# Patient Record
Sex: MALE | Race: WHITE | URBAN - METROPOLITAN AREA
[De-identification: names, ages, dates, MRNs, and addresses within clinical notes are randomized per-mention and may not be internally consistent; named-entity substitution may affect disease eponyms.]

---

## 2024-10-08 ENCOUNTER — APPOINTMENT (OUTPATIENT)
Dept: PHYSICAL MEDICINE AND REHAB | Facility: MEDICAL CENTER | Age: 59
End: 2024-10-08
Payer: COMMERCIAL

## 2024-10-08 VITALS
HEIGHT: 71 IN | HEART RATE: 55 BPM | DIASTOLIC BLOOD PRESSURE: 78 MMHG | TEMPERATURE: 98.3 F | SYSTOLIC BLOOD PRESSURE: 116 MMHG | WEIGHT: 148.4 LBS | BODY MASS INDEX: 20.77 KG/M2 | OXYGEN SATURATION: 96 %

## 2024-10-08 DIAGNOSIS — M25.511 CHRONIC RIGHT SHOULDER PAIN: ICD-10-CM

## 2024-10-08 DIAGNOSIS — G89.29 CHRONIC RIGHT SHOULDER PAIN: ICD-10-CM

## 2024-10-08 DIAGNOSIS — S43.431A SUPERIOR GLENOID LABRUM LESION OF RIGHT SHOULDER, INITIAL ENCOUNTER: ICD-10-CM

## 2024-10-08 PROCEDURE — 3078F DIAST BP <80 MM HG: CPT | Performed by: PHYSICAL MEDICINE & REHABILITATION

## 2024-10-08 PROCEDURE — G2211 COMPLEX E/M VISIT ADD ON: HCPCS | Performed by: PHYSICAL MEDICINE & REHABILITATION

## 2024-10-08 PROCEDURE — 3074F SYST BP LT 130 MM HG: CPT | Performed by: PHYSICAL MEDICINE & REHABILITATION

## 2024-10-08 PROCEDURE — 1125F AMNT PAIN NOTED PAIN PRSNT: CPT | Performed by: PHYSICAL MEDICINE & REHABILITATION

## 2024-10-08 PROCEDURE — 99204 OFFICE O/P NEW MOD 45 MIN: CPT | Performed by: PHYSICAL MEDICINE & REHABILITATION

## 2024-10-08 RX ORDER — IBUPROFEN 200 MG
200 TABLET ORAL EVERY 6 HOURS PRN
COMMUNITY

## 2024-10-08 ASSESSMENT — PATIENT HEALTH QUESTIONNAIRE - PHQ9: CLINICAL INTERPRETATION OF PHQ2 SCORE: 0

## 2024-10-08 ASSESSMENT — PAIN SCALES - GENERAL: PAINLEVEL: 6=MODERATE PAIN

## 2024-11-20 ENCOUNTER — PHYSICAL THERAPY (OUTPATIENT)
Dept: PHYSICAL THERAPY | Facility: REHABILITATION | Age: 59
End: 2024-11-20
Attending: PHYSICAL MEDICINE & REHABILITATION
Payer: COMMERCIAL

## 2024-11-20 DIAGNOSIS — M25.511 RIGHT SHOULDER PAIN, UNSPECIFIED CHRONICITY: ICD-10-CM

## 2024-11-20 PROCEDURE — 97161 PT EVAL LOW COMPLEX 20 MIN: CPT

## 2024-11-20 PROCEDURE — 97110 THERAPEUTIC EXERCISES: CPT

## 2024-11-20 ASSESSMENT — ENCOUNTER SYMPTOMS
PAIN SCALE AT HIGHEST: 8
PAIN SCALE: 0

## 2024-11-20 NOTE — OP THERAPY EVALUATION
Outpatient Physical Therapy  INITIAL EVALUATION    [facility] [place]  [address]  [place] [address]  Phone:  [phone]  Fax:  [phone]    Date of Evaluation: 2024    Patient: Bryce Espitia  YOB: 1965  MRN: [id number]     Referring Provider: Hao Hernandez M.D.  [address]  [address]  [place] [address]   Referring Diagnosis Chronic right shoulder pain [M25.511, G89.29];Superior glenoid labrum lesion of right shoulder, initial encounter [S43.431A]     Time Calculation  Start time: 1030  Stop time: 1130 Time Calculation (min): 60 minutes         Chief Complaint: R shoulder    Visit Diagnoses     ICD-10-CM   1. Right shoulder pain, unspecified chronicity  M25.511       Date of onset of impairment: No data found    Subjective:   History of Present Illness:     Mechanism of injury:  Bryce Espitia is a 59 y.o. male that presents to therapy with right shoulder pain. His symptoms came on with insidious onset and has been occurring for about a year or maybe longer. His pain is located along the font of his R shoulder without descending. Patient denies fevers/chills, numbness/weakness of upper extremities, dizziness, dysphagia, nausea, diplopia, ataxia, and dysarthria.    Aggravating factors: overhead pressing, lifting weights, laying on his back, waking up at night in pain.   Relieving factors:rest    ADL limitations: limited activity tolerance, limited sleep   Pain:     Current pain ratin (at rest)    At worst pain ratin      Past Medical History:   Diagnosis Date    Glaucoma      Past Surgical History:   Procedure Laterality Date    HERNIA REPAIR       Social History     Tobacco Use    Smoking status: Never    Smokeless tobacco: Never   Substance Use Topics    Alcohol use: Not on file     Family and Occupational History     Socioeconomic History    Marital status: Single     Spouse name: Not on file    Number of children: Not on file     Years of education: Not on file    Highest education level: Not on file   Occupational History    Not on file       Objective     Cervical Screen    Cervical range of motion within normal limits  Thoracic Screen    Thoracic range of motion within normal limits    Active Range of Motion   Left Shoulder   Normal active range of motion  Flexion: 165 degrees     Right Shoulder   Flexion: 160 degrees   Abduction: WFL  External rotation 90°: WFL  Internal rotation 90°: WFL    Passive Range of Motion   Left Shoulder   Normal passive range of motion    Right Shoulder   Normal passive range of motion    Strength:      Left Shoulder   Normal muscle strength    Right Shoulder   Normal muscle strength    Tests     Right Shoulder   Positive crank.   Negative empty can, full can, jerk, Neer's, painful arc and Speed's.         Therapeutic Exercises (CPT 44777):       Therapeutic Exercise Summary: HEP instruction/performance and development. Handout provided and exercises located below:  Access Code: [email or web address]  URL: [email or web address]  Date: 11/20/2024  Prepared by: Artemio Carvalho    Program Notes  pictures of machine, (pull down handle)    Exercises  - Standing Bilateral Low Shoulder Row with Anchored Resistance  - 1 x daily - 2-3 sets - 8-12 reps  - Standing Shoulder Flexion Stretch on Wall  - 1-2 x daily - 1-2 reps - 15-20 hold  - Shoulder External Rotation and Scapular Retraction with Resistance  - 2 x daily - 1-2 sets - 20 reps  - Seated Scapular Retraction  - 3-5 x daily - 10 reps    Patient Education  - Shoulder Instability (handout for shoulder labral information)      Time-based treatments/modalities:    Physical Therapy Timed Treatment Charges  Therapeutic exercise minutes (CPT 78200): 25 minutes      Assessment, Response and Plan:   Assessment details:  Bryce Espitia is a 59 y.o. male with signs and symptoms consistent with suspected minor R shoulder labral pathology. He requires skilled physical therapy  intervention to decrease pain, increase range of motion, increase functional mobility, improve ADL completion and establish a home exercise program.  Goals:   Short Term Goals:   1. Patient will be Independent with prescribed Home Exercise Program (HEP) and will be able to demonstrate exercises without cues for improved overall symptoms/activity tolerance.   2. Pt will improve R shoulder flexion AROM to 165 or greater for improved overhead reach during all overhead activities.  Short term goal time span:  2-4 weeks      Long Term Goals:    3. Pt will improve ability to perform some form of overhead lifting without reported increase in pain >2/10  4. Pt will have improved SPADI score of less than 10/130 indicative of improved function and reduced disability.  Long term goal time span:  4-6 weeks    Plan:   Planned therapy interventions:  Therapeutic Exercise (CPT 70745), Manual Therapy (CPT 15153), Neuromuscular Re-education (CPT 87072) and E Stim Unattended (CPT 56022)  Frequency:  1x week  Duration in weeks:  8  Discussed with:  Patient  Plan details:  Appts to likely taper in frequency with promotion towards independence.     Functional Assessment Used  PT Functional Assessment Tool Used: SPADI  PT Functional Assessment Score: 19/120     Referring provider co-signature:  I have reviewed this plan of care and my co-signature certifies the need for services.    Certification Period: 11/20/2024 to  01/15/25    Physician Signature: ________________________________ Date: ______________

## 2024-11-26 ENCOUNTER — APPOINTMENT (OUTPATIENT)
Dept: PHYSICAL THERAPY | Facility: REHABILITATION | Age: 59
End: 2024-11-26
Attending: PHYSICAL MEDICINE & REHABILITATION
Payer: COMMERCIAL

## 2024-12-11 ENCOUNTER — APPOINTMENT (OUTPATIENT)
Dept: PHYSICAL THERAPY | Facility: REHABILITATION | Age: 59
End: 2024-12-11
Attending: PHYSICAL MEDICINE & REHABILITATION
Payer: COMMERCIAL

## 2024-12-11 NOTE — OP THERAPY DAILY TREATMENT
"  Outpatient Physical Therapy  DAILY TREATMENT     [facility] [place]  [address], [address]  [facility] [address]  Phone:  [phone]  Fax:  [phone]    Date: 12/11/2024    Patient: Bryce Espitia  YOB: 1965  MRN: [id number]     Time Calculation                   Chief Complaint: No chief complaint on file.    Visit #: 2    SUBJECTIVE:  ***    OBJECTIVE:  Current objective measures: ***          Therapeutic Exercises (CPT 19341):       Therapeutic Exercise Summary: HEP instruction/performance and development. Handout provided and exercises located below:  Access Code: [email or web address]  URL: [email or web address]  Date: 11/20/2024  Prepared by: Artemio Carvalho    Program Notes  pictures of machine, (pull down handle)    Exercises  - Standing Bilateral Low Shoulder Row with Anchored Resistance  - 1 x daily - 2-3 sets - 8-12 reps  - Standing Shoulder Flexion Stretch on Wall  - 1-2 x daily - 1-2 reps - 15-20 hold  - Shoulder External Rotation and Scapular Retraction with Resistance  - 2 x daily - 1-2 sets - 20 reps  - Seated Scapular Retraction  - 3-5 x daily - 10 reps    Patient Education  - Shoulder Instability (handout for shoulder labral information)      Time-based treatments/modalities:           Pain rating (1-10) before treatment:  {PAIN NUMBERS_1-10:01121}  Pain rating (1-10) after treatment:  {PAIN NUMBERS_1-10:30047}    ASSESSMENT:   Response to treatment: ***    PLAN/RECOMMENDATIONS:   Plan for treatment: {AMB OP THERAPY - THERAPY PLAN:[id number]::\"therapy treatment to continue next visit\"}.  Planned interventions for next visit: {PT PLANNED THERAPY INTERVENTIONS:869139326::\"continue with current treatment\"}.         "

## 2024-12-12 ENCOUNTER — PHYSICAL THERAPY (OUTPATIENT)
Dept: PHYSICAL THERAPY | Facility: REHABILITATION | Age: 59
End: 2024-12-12
Attending: PHYSICAL MEDICINE & REHABILITATION
Payer: COMMERCIAL

## 2024-12-12 DIAGNOSIS — M25.511 RIGHT SHOULDER PAIN, UNSPECIFIED CHRONICITY: ICD-10-CM

## 2024-12-12 PROCEDURE — 97110 THERAPEUTIC EXERCISES: CPT

## 2024-12-12 NOTE — OP THERAPY DAILY TREATMENT
Outpatient Physical Therapy  DAILY TREATMENT     [facility] [place]  [address], [address]  [facility] [address]  Phone:  [phone]  Fax:  [phone]    Date: 12/12/2024    Patient: Bryce Espitia  YOB: 1965  MRN: [id number]     Time Calculation    Start time: 0730  Stop time: 0815 Time Calculation (min): 45 minutes         Chief Complaint: R shoulder   Visit #: 2    SUBJECTIVE:  Reports compliance of HEP. Notes no increase in pain and overall controlled symptoms. Has avoided overhead pressing movements.     OBJECTIVE:  Current objective measures: ROM WNL          Therapeutic Exercises (CPT 75984):     1. pulleys, 2min    2. Supine stick flexion, x 15, ER stick full motion    3. standing scaption, 2# 2x 15    4. banded ER, green x 25    5. banded low row, with scap retraction x 30 purple      Therapeutic Exercise Summary: HEP instruction/performance and development. Handout provided and exercises located below:  Access Code: [email or web address]  URL: [email or web address]  Date: 12/12/2024  Prepared by: Artemio Carvalho    Exercises  - Standing Shoulder Flexion Stretch on Wall  - 1-2 x daily - 15-20 hold  - Scaption with Dumbbells  - 2 x daily - 10 reps  - Shoulder External Rotation and Scapular Retraction with Resistance  - 2 x daily - 20 reps  - Standing Bilateral Low Shoulder Row with Anchored Resistance  - 3 x weekly - 2-3 sets - 8-12 reps  - Lat Pull Down  - 3 x weekly - 2 sets - 10-12 reps      Time-based treatments/modalities:    Physical Therapy Timed Treatment Charges  Therapeutic exercise minutes (CPT 80060): 45 minutes      Pain rating (1-10) before treatment:  0  Pain rating (1-10) after treatment:  0    ASSESSMENT:   Response to treatment: Symptoms controlled and consistent sub acromial pain. HEP progressed and tolerated well. F/u next week.     PLAN/RECOMMENDATIONS:   Plan for treatment: therapy treatment to continue next visit.  Planned interventions for next  visit: continue with current treatment.

## 2024-12-17 ENCOUNTER — APPOINTMENT (OUTPATIENT)
Dept: PHYSICAL THERAPY | Facility: REHABILITATION | Age: 59
End: 2024-12-17
Attending: PHYSICAL MEDICINE & REHABILITATION
Payer: COMMERCIAL

## 2024-12-19 ENCOUNTER — PHYSICAL THERAPY (OUTPATIENT)
Dept: PHYSICAL THERAPY | Facility: REHABILITATION | Age: 59
End: 2024-12-19
Attending: PHYSICAL MEDICINE & REHABILITATION
Payer: COMMERCIAL

## 2024-12-19 DIAGNOSIS — M25.511 RIGHT SHOULDER PAIN, UNSPECIFIED CHRONICITY: ICD-10-CM

## 2024-12-19 PROCEDURE — 97110 THERAPEUTIC EXERCISES: CPT

## 2024-12-19 NOTE — OP THERAPY DAILY TREATMENT
Outpatient Physical Therapy  DAILY TREATMENT     [facility] [place]  [address] [place] [address], [address]  [facility] [address]  Phone:  [phone]  Fax:  [phone]    Date: 12/19/2024    Patient: Bryce Espitia  YOB: 1965  MRN: [id number]     Time Calculation    Start time: 1540  Stop time: 1630 Time Calculation (min): 50 minutes       Chief Complaint: R shoulder   Visit #: 3    SUBJECTIVE:  Reports compliance of HEP. Notes no increase in pain and overall controlled symptoms. No night pain reported either.     OBJECTIVE:  Current objective measures: ROM WNL          Therapeutic Exercises (CPT 44911):     1. Supine foam roller series, blue band x 5min    3. discussion on gym exercises with reproduction of movement in gym and progression vs scheduling days off      Therapeutic Exercise Summary: HEP instruction/performance and development. Handout provided and exercises located below:  Access Code: [email or web address]  URL: [email or web address]  Date: 12/19/2024  Prepared by: Artemio Carvalho    Exercises  - Scaption with Dumbbells  - 2 x daily - 3 x weekly - 10-12 reps  - Shoulder External Rotation and Scapular Retraction with Resistance  - 3 x weekly - 1-2 sets - 20 reps  - Seated Row Cable Machine  - 3 x weekly - 2-3 sets - 8-12 reps  - Lat Pull Down  - 3 x weekly - 2 sets - 10-12 reps  - Supine Shoulder Horizontal Abduction with Resistance  - 3 x weekly - 2 sets - 15-20 reps  - Supine Chest Stretch on Foam Roll  - 3 x weekly      Time-based treatments/modalities:    Physical Therapy Timed Treatment Charges  Therapeutic exercise minutes (CPT 50718): 50 minutes      Pain rating (1-10) before treatment:  0  Pain rating (1-10) after treatment:  0    ASSESSMENT:   Response to treatment: Symptoms controlled and consistent sub acromial pain. HEP progressed/practiced and tolerated well. F/u next week.      PLAN/RECOMMENDATIONS:   Plan for treatment: therapy treatment to continue next  visit.  Planned interventions for next visit: continue with current treatment.

## 2025-01-08 ENCOUNTER — PHYSICAL THERAPY (OUTPATIENT)
Dept: PHYSICAL THERAPY | Facility: REHABILITATION | Age: 60
End: 2025-01-08
Attending: PHYSICAL MEDICINE & REHABILITATION
Payer: COMMERCIAL

## 2025-01-08 DIAGNOSIS — M25.511 RIGHT SHOULDER PAIN, UNSPECIFIED CHRONICITY: ICD-10-CM

## 2025-01-08 PROCEDURE — 97110 THERAPEUTIC EXERCISES: CPT

## 2025-01-08 NOTE — OP THERAPY DAILY TREATMENT
Outpatient Physical Therapy  DAILY TREATMENT     [facility] [place]  [address]  [facility] [address]  Phone:  [phone]  Fax:  [phone]    Date: 01/08/2025    Patient: Bryce Espitia  YOB: 1965  MRN: [id number]     Time Calculation    Start time: 1030  Stop time: 1115 Time Calculation (min): 45 minutes       Chief Complaint: R shoulder   Visit #: 4    SUBJECTIVE:   No reported issues with exercises or any functional activity. Reports compliance of HEP. Notes no increase in pain and overall controlled symptoms. Continued reports of no night pain.     OBJECTIVE:  Current objective measures: ROM WNL  PT Functional Assessment Tool Used: SPADI  PT Functional Assessment Score: 3/130       Therapeutic Exercises (CPT 62622):     1. discussion on gym exercises with reproduction of movement in gym and progression vs scheduling days off    2. long term expectations and progression discussed.      Therapeutic Exercise Summary: HEP instruction/performance and development. Handout provided and exercises located below:  Access Code: [email or web address]  URL: [email or web address]  Date: 12/19/2024  Prepared by: Artemio Carvalho    Exercises  - Scaption with Dumbbells  - 2 x daily - 3 x weekly - 10-12 reps  - Shoulder External Rotation and Scapular Retraction with Resistance  - 3 x weekly - 1-2 sets - 20 reps  - Seated Row Cable Machine  - 3 x weekly - 2-3 sets - 8-12 reps  - Lat Pull Down  - 3 x weekly - 2 sets - 10-12 reps  - Supine Shoulder Horizontal Abduction with Resistance  - 3 x weekly - 2 sets - 15-20 reps  - Supine Chest Stretch on Foam Roll  - 3 x weekly      Time-based treatments/modalities:    Physical Therapy Timed Treatment Charges  Therapeutic exercise minutes (CPT 31307): 38 minutes      Pain rating (1-10) before treatment:  0  Pain rating (1-10) after treatment:  0    ASSESSMENT:   Response to treatment: Symptoms controlled and consistent with recovering sub  acromial pain. Goals met. D/C from PT.      PLAN/RECOMMENDATIONS:   Plan for treatment: discharge patient due to accomplished goals.

## 2025-01-08 NOTE — OP THERAPY DISCHARGE SUMMARY
Outpatient Physical Therapy  DISCHARGE SUMMARY NOTE      [facility] [place]  [address]  [facility] [address]  Phone:  [phone]  Fax:  [phone]    Date of Visit: 01/08/2025    Patient: Bryce Espitia  YOB: 1965  MRN: [id number]     Referring Provider: Hao Hernandez M.D.  [address]  [address]  [place] [address]   Referring Diagnosis Other chronic pain [G89.29]         Functional Assessment Used  PT Functional Assessment Tool Used: SPADI  PT Functional Assessment Score: 3/130     Your patient is being discharged from Physical Therapy with the following comments:   Goals met    Comments:  Bryce Espitia has completed 4 physical therapy sessions on his current prescription. He has improved function, decreased pain, improved strength, consistent ROM,  and he continues to progress with his home exercise program. Recommend to discharge patient to full independent home exercise program at this time. Thank you for the opportunity to assist you and your patient.         Limitations Remaining:  none    Recommendations:  Continue HEP as discussed.     Artemio Carvalho, PT, DPT    Date: 1/8/2025

## 2025-02-12 ENCOUNTER — OFFICE VISIT (OUTPATIENT)
Dept: URGENT CARE | Facility: PHYSICIAN GROUP | Age: 60
End: 2025-02-12
Payer: COMMERCIAL

## 2025-02-12 VITALS
HEART RATE: 71 BPM | TEMPERATURE: 98 F | BODY MASS INDEX: 20.02 KG/M2 | OXYGEN SATURATION: 100 % | HEIGHT: 71 IN | WEIGHT: 143 LBS | DIASTOLIC BLOOD PRESSURE: 68 MMHG | RESPIRATION RATE: 20 BRPM | SYSTOLIC BLOOD PRESSURE: 128 MMHG

## 2025-02-12 DIAGNOSIS — J01.10 ACUTE FRONTAL SINUSITIS, RECURRENCE NOT SPECIFIED: ICD-10-CM

## 2025-02-12 PROCEDURE — 3078F DIAST BP <80 MM HG: CPT

## 2025-02-12 PROCEDURE — 3074F SYST BP LT 130 MM HG: CPT

## 2025-02-12 PROCEDURE — 99203 OFFICE O/P NEW LOW 30 MIN: CPT

## 2025-02-12 RX ORDER — PSEUDOEPHEDRINE HCL 30 MG/1
60 TABLET, FILM COATED ORAL EVERY 4 HOURS PRN
Qty: 120 TABLET | Refills: 6 | Status: SHIPPED | OUTPATIENT
Start: 2025-02-12

## 2025-02-12 RX ORDER — PREDNISONE 20 MG/1
40 TABLET ORAL DAILY
Qty: 8 TABLET | Refills: 0 | Status: SHIPPED | OUTPATIENT
Start: 2025-02-12 | End: 2025-02-16

## 2025-02-12 ASSESSMENT — ENCOUNTER SYMPTOMS
VOMITING: 0
SORE THROAT: 0
HEADACHES: 0
MYALGIAS: 0
FEVER: 0
COUGH: 1
PALPITATIONS: 0
TINGLING: 0
SHORTNESS OF BREATH: 0
BLURRED VISION: 0
SINUS PAIN: 0
SPUTUM PRODUCTION: 1
HEMOPTYSIS: 0
DIARRHEA: 0
DOUBLE VISION: 0
NAUSEA: 0
WHEEZING: 0
DIZZINESS: 0
WEAKNESS: 0
CHILLS: 0

## 2025-02-12 NOTE — PROGRESS NOTES
Subjective:   Bryce Espitia is a 59 y.o. male who presents for Nasal Congestion (On set 3 days)    Patient presents to the clinic for complaints of nasal congestion and cough x 3 days.  States that he has been feeling a bit run down over the last 10 days or so due to a lot of travelling, but started feeling sick 3 days ago.   Cough has been mild, intermittent, and occasionally productive w/ minimal sputum.  His nasal congestion is his symptom of highest concern with pressure being built up over the last few days.  Otherwise, he does not feel ill or sick.  Has taken OTC cough and cold medications without any improvement of his symptoms.  Patient denies fever, chills, body aches, sore throat, weakness, ear pain, N/V/D, or dizziness/lightheadedness.     HPI    Review of Systems   Constitutional:  Negative for chills, fever and malaise/fatigue.   HENT:  Positive for congestion. Negative for ear pain, sinus pain and sore throat.    Eyes:  Negative for blurred vision and double vision.   Respiratory:  Positive for cough and sputum production. Negative for hemoptysis, shortness of breath and wheezing.    Cardiovascular:  Negative for chest pain and palpitations.   Gastrointestinal:  Negative for diarrhea, nausea and vomiting.   Genitourinary:  Negative for dysuria.   Musculoskeletal:  Negative for myalgias.   Neurological:  Negative for dizziness, tingling, weakness and headaches.   All other systems reviewed and are negative.    Refer to HPI for additional details.    During this visit, appropriate PPE was worn, and hand hygiene was performed.    PMH:  has a past medical history of Glaucoma.    MEDS:   Current Outpatient Medications:     predniSONE (DELTASONE) 20 MG Tab, Take 2 Tablets by mouth every day for 4 days., Disp: 8 Tablet, Rfl: 0    pseudoephedrine (SUDAFED) 30 MG Tab, Take 2 Tablets by mouth every four hours as needed for Congestion., Disp: 120 Tablet, Rfl: 6    Multiple Vitamin (MULTIVITAMINS PO), Take  " by mouth., Disp: , Rfl:     ibuprofen (MOTRIN) 200 MG Tab, Take 200 mg by mouth every 6 hours as needed for Mild Pain., Disp: , Rfl:     diclofenac sodium (VOLTAREN) 1 % Gel, Apply 3 g topically 4 times a day as needed (pain)., Disp: 100 g, Rfl: 11    ALLERGIES: No Known Allergies  SURGHX:   Past Surgical History:   Procedure Laterality Date    HERNIA REPAIR       SOCHX:  reports that he has never smoked. He has never used smokeless tobacco.    FH: Per HPI as applicable/pertinent.    Medications, Allergies, and current problem list reviewed today in Epic.     Objective:     /68 (BP Location: Left arm, Patient Position: Sitting, BP Cuff Size: Adult long)   Pulse 71   Temp 36.7 °C (98 °F) (Temporal)   Resp 20   Ht 1.803 m (5' 11\")   Wt 64.9 kg (143 lb)   SpO2 100%     Physical Exam  Constitutional:       Appearance: Normal appearance. He is not ill-appearing or toxic-appearing.   HENT:      Head: Normocephalic.      Right Ear: Tympanic membrane, ear canal and external ear normal.      Left Ear: Tympanic membrane, ear canal and external ear normal.      Nose: Congestion present. No rhinorrhea.      Mouth/Throat:      Mouth: Mucous membranes are moist.      Pharynx: Oropharynx is clear. No oropharyngeal exudate or posterior oropharyngeal erythema.   Eyes:      General:         Right eye: No discharge.         Left eye: No discharge.      Extraocular Movements: Extraocular movements intact.      Conjunctiva/sclera: Conjunctivae normal.      Pupils: Pupils are equal, round, and reactive to light.   Cardiovascular:      Rate and Rhythm: Normal rate and regular rhythm.      Pulses: Normal pulses.      Heart sounds: Normal heart sounds. No murmur heard.  Pulmonary:      Effort: Pulmonary effort is normal. No respiratory distress.      Breath sounds: Normal breath sounds. No wheezing or rhonchi.   Abdominal:      General: Abdomen is flat.   Musculoskeletal:         General: No signs of injury. Normal range of " motion.      Cervical back: Normal range of motion. No rigidity.   Lymphadenopathy:      Cervical: No cervical adenopathy.   Skin:     General: Skin is warm and dry.   Neurological:      General: No focal deficit present.      Mental Status: He is alert and oriented to person, place, and time.      Motor: No weakness.         Assessment/Plan:     Diagnosis and associated orders:     1. Acute frontal sinusitis, recurrence not specified  - predniSONE (DELTASONE) 20 MG Tab; Take 2 Tablets by mouth every day for 4 days.  Dispense: 8 Tablet; Refill: 0  - pseudoephedrine (SUDAFED) 30 MG Tab; Take 2 Tablets by mouth every four hours as needed for Congestion.  Dispense: 120 Tablet; Refill: 6     Comments/MDM:     Discussed that likely etiology of the patient's symptoms is acute frontal sinusitis, likely of viral etiology.  Prednisone 4-day course as well as Sudafed as needed prescribed to the patient. Discussed proper administration and dosing as well as associated adverse/side effects of prescribed medications.  Advised patient to continue OTC pharmacologic and nonpharmacologic treatment of her symptoms, including but not limited to Tylenol, Motrin, OTC cough and cold medication, decongestants, and plenty of rest and fluids.  Patient agreeable to this plan of care.  All questions answered.  Return to clinic if symptoms persist or worsen.  Red flag symptoms warranting emergency medical services discussed, including but not limited to chest pain, SOB, wheezing, difficulty breathing or swallowing, sensation of throat closing or mass in the throat, severe intractable headache, changes to vision or hearing, palpitations or racing heart rate, abdominal pain, fever greater than 103F despite medication management, dizziness/lightheadedness/vertigo, or nausea/vomiting/diarrhea.           Differential diagnosis, natural history, supportive care, and indications for immediate follow-up discussed.    Advised the patient to follow-up  with the primary care physician for recheck, reevaluation, and consideration of further management.    Instructed patient to seek emergency medical attention via calling EMS or going to the Emergency Room for red flag symptoms, including but not limited to: chest pain, palpitations, fever greater than 103F, shortness of breath, wheezing, new or worsened numbness/tingling, focal or unilateral weakness, and bloody vomit/stool.     Please note that this dictation was created using voice recognition software. I have made a reasonable attempt to correct obvious errors, but I expect that there are errors of grammar and possibly content that I did not discover before finalizing the note.    This note was electronically signed by TOO Méndez